# Patient Record
Sex: FEMALE | Race: WHITE | NOT HISPANIC OR LATINO
[De-identification: names, ages, dates, MRNs, and addresses within clinical notes are randomized per-mention and may not be internally consistent; named-entity substitution may affect disease eponyms.]

---

## 2021-04-14 PROBLEM — Z00.00 ENCOUNTER FOR PREVENTIVE HEALTH EXAMINATION: Status: ACTIVE | Noted: 2021-04-14

## 2021-04-19 ENCOUNTER — APPOINTMENT (OUTPATIENT)
Dept: RADIATION ONCOLOGY | Facility: CLINIC | Age: 86
End: 2021-04-19
Payer: MEDICARE

## 2021-04-19 VITALS
SYSTOLIC BLOOD PRESSURE: 129 MMHG | TEMPERATURE: 98 F | HEART RATE: 76 BPM | RESPIRATION RATE: 14 BRPM | OXYGEN SATURATION: 98 % | DIASTOLIC BLOOD PRESSURE: 76 MMHG

## 2021-04-19 DIAGNOSIS — Z86.39 PERSONAL HISTORY OF OTHER ENDOCRINE, NUTRITIONAL AND METABOLIC DISEASE: ICD-10-CM

## 2021-04-19 DIAGNOSIS — Z86.69 PERSONAL HISTORY OF OTHER DISEASES OF THE NERVOUS SYSTEM AND SENSE ORGANS: ICD-10-CM

## 2021-04-19 DIAGNOSIS — Z85.828 PERSONAL HISTORY OF OTHER MALIGNANT NEOPLASM OF SKIN: ICD-10-CM

## 2021-04-19 DIAGNOSIS — Z86.79 PERSONAL HISTORY OF OTHER DISEASES OF THE CIRCULATORY SYSTEM: ICD-10-CM

## 2021-04-19 PROCEDURE — 99072 ADDL SUPL MATRL&STAF TM PHE: CPT

## 2021-04-19 PROCEDURE — 99204 OFFICE O/P NEW MOD 45 MIN: CPT

## 2021-04-19 NOTE — HISTORY OF PRESENT ILLNESS
[FreeTextEntry1] : Ms. Ortega is a 85 year old female referred here by Dr. 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 Specimen C, left anterior ankle, 1.2cm, and specimen D, left mid pretibial lower leg, 1.2cm. The patient is to return to Dr. Rivera on 4/29/21 for follow up on the left distal pretibial lower leg for evidence of recurrence. She was also seeing Dr. Guerrero for suture removal on the left forearm, and he was to evaluate the left lateral distal lower leg. \par \par Mrs. Ortega states she is overall feeling well. She had redness and swelling of the left lower leg. She denies being in any pain today but she does occasionally have discomfort in that lower leg. She was on Keflex for a cellulitis that she recently completed.  She would like information on having high Dose Radiation to the left lower leg. \par

## 2021-04-19 NOTE — PHYSICAL EXAM
[Normal] : oriented to person, place and time, the affect was normal, the mood was normal and not anxious [de-identified] : A raised crusted lesion measuring 2 cm in diameter over left lower lateral leg with reasonably well-defined margins.  2 other smaller punched-out lesions noted over left ankle and left mid pretibial region .

## 2021-05-21 ENCOUNTER — NON-APPOINTMENT (OUTPATIENT)
Age: 86
End: 2021-05-21

## 2021-05-28 ENCOUNTER — NON-APPOINTMENT (OUTPATIENT)
Age: 86
End: 2021-05-28

## 2021-05-28 VITALS
HEART RATE: 74 BPM | RESPIRATION RATE: 12 BRPM | OXYGEN SATURATION: 98 % | SYSTOLIC BLOOD PRESSURE: 124 MMHG | HEIGHT: 60 IN | TEMPERATURE: 98 F | BODY MASS INDEX: 21.2 KG/M2 | DIASTOLIC BLOOD PRESSURE: 74 MMHG | WEIGHT: 108 LBS

## 2021-05-28 NOTE — REVIEW OF SYSTEMS
[Edema Limbs: Grade 1-  5 - 10% inter-limb discrepancy in volume or circumference at point of greatest visible difference; swelling or obscuration of anatomic architecture on close inspection] : Edema Limbs: Grade 1-  5 - 10% inter-limb discrepancy in volume or circumference at point of greatest visible difference; swelling or obscuration of anatomic architecture on close inspection [Fatigue: Grade 1 - Fatigue relieved by rest] : Fatigue: Grade 1 - Fatigue relieved by rest [Alopecia: Grade 0] : Alopecia: Grade 0 [Pruritus: Grade 0] : Pruritus: Grade 0 [Skin Atrophy: Grade 0] : Skin Atrophy: Grade 0 [Skin Hyperpigmentation: Grade 1 - Hyperpigmentation covering <10% BSA; no psychosocial impact] : Skin Hyperpigmentation: Grade 1 - Hyperpigmentation covering <10% BSA; no psychosocial impact [Skin Induration: Grade 0] : Skin Induration: Grade 0 [Dermatitis Radiation: Grade 1 - Faint erythema or dry desquamation] : Dermatitis Radiation: Grade 1 - Faint erythema or dry desquamation

## 2021-05-28 NOTE — VITALS
[Maximal Pain Intensity: 0/10] : 0/10 [Least Pain Intensity: 0/10] : 0/10 [70: Cares for self; unalbe to carry on normal activity or do active work.] : 70: Cares for self; unable to carry on normal activity or do active work. [70: Both greater restriction of and less time spent in play activity.] : 70: Both greater restriction of and less time spent in play activity. [ECOG Performance Status: 2 - Ambulatory and capable of all self care but unable to carry out any work activities] : Performance Status: 2 - Ambulatory and capable of all self care but unable to carry out any work activities. Up and about more than 50% of waking hours [Date: ____________] : Patient's last distress assessment performed on [unfilled]. [0 - No Distress] : Distress Level: 0

## 2021-06-01 ENCOUNTER — NON-APPOINTMENT (OUTPATIENT)
Age: 86
End: 2021-06-01

## 2021-06-01 VITALS
TEMPERATURE: 98 F | HEART RATE: 75 BPM | SYSTOLIC BLOOD PRESSURE: 122 MMHG | OXYGEN SATURATION: 97 % | RESPIRATION RATE: 14 BRPM | DIASTOLIC BLOOD PRESSURE: 83 MMHG

## 2021-06-01 NOTE — HISTORY OF PRESENT ILLNESS
[FreeTextEntry1] : Ms. Ortega is a 85 year old female referred here by Dr. 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 Specimen C, left anterior ankle, 1.2cm, and specimen D, left mid pretibial lower leg, 1.2cm. The patient is to return to Dr. Rivera on 4/29/21 for follow up on the left distal pretibial lower leg for evidence of recurrence. She was also seeing Dr. Guerrero for suture removal on the left forearm, and he was to evaluate the left lateral distal lower leg. \par \par She is here 5/28/21 for an OTV and has completed 4 of 8 fractions 2000cGy to the left leg. She is uaing Aquaphor BID. \par She has slight erythema and swelling of the left lower leg. She denies being in any pain today but she does occasionally have discomfort in that lower leg\par

## 2021-06-01 NOTE — PHYSICAL EXAM
[Normal] : oriented to person, place and time, the affect was normal, the mood was normal and not anxious [de-identified] : Partial regression of the lesion over left leg.  Mild erythema seen

## 2021-06-01 NOTE — DISEASE MANAGEMENT
[Clinical] : TNM Stage: c [I] : I [TTNM] : 1 [NTNM] : 0 [MTNM] : 0 [de-identified] : completed 4 of 8 fractions 2000cGy

## 2021-06-01 NOTE — PHYSICAL EXAM
[Normal] : oriented to person, place and time, the affect was normal, the mood was normal and not anxious [de-identified] : Significant regression of the lesion in her left lower leg.  Moderate erythema of the skin noted

## 2021-06-01 NOTE — HISTORY OF PRESENT ILLNESS
[FreeTextEntry1] : Ms. Ortega is a 85 year old female referred here by Dr. 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 Specimen C, left anterior ankle, 1.2cm, and specimen D, left mid pretibial lower leg, 1.2cm. The patient is to return to Dr. Rivera on 4/29/21 for follow up on the left distal pretibial lower leg for evidence of recurrence. She was also seeing Dr. Guerrero for suture removal on the left forearm, and he was to evaluate the left lateral distal lower leg. \par \par She is here 5/28/21 for an OTV and has completed 4 of 8 fractions 2000cGy to the left leg. She is uaing Aquaphor BID. \par She has slight erythema and swelling of the left lower leg. She denies being in any pain today but she does occasionally have discomfort in that lower leg\par \par

## 2021-06-01 NOTE — DISEASE MANAGEMENT
[Clinical] : TNM Stage: c [I] : I [TTNM] : 1 [NTNM] : 0 [MTNM] : 0 [de-identified] : completed 5 of 8 fractions 2500cGy

## 2021-06-07 ENCOUNTER — NON-APPOINTMENT (OUTPATIENT)
Age: 86
End: 2021-06-07

## 2021-06-07 VITALS
TEMPERATURE: 98 F | OXYGEN SATURATION: 98 % | DIASTOLIC BLOOD PRESSURE: 84 MMHG | HEART RATE: 78 BPM | RESPIRATION RATE: 16 BRPM | SYSTOLIC BLOOD PRESSURE: 126 MMHG

## 2021-06-07 NOTE — VITALS
[Maximal Pain Intensity: 0/10] : 0/10 [Least Pain Intensity: 0/10] : 0/10 [NoTreatment Scheduled] : no treatment scheduled [70: Cares for self; unalbe to carry on normal activity or do active work.] : 70: Cares for self; unable to carry on normal activity or do active work. [70: Both greater restriction of and less time spent in play activity.] : 70: Both greater restriction of and less time spent in play activity. [ECOG Performance Status: 2 - Ambulatory and capable of all self care but unable to carry out any work activities] : Performance Status: 2 - Ambulatory and capable of all self care but unable to carry out any work activities. Up and about more than 50% of waking hours [Date: ____________] : Patient's last distress assessment performed on [unfilled]. [0 - No Distress] : Distress Level: 0

## 2021-06-08 NOTE — DISEASE MANAGEMENT
[Clinical] : TNM Stage: c [I] : I [TTNM] : 1 [NTNM] : 0 [MTNM] : 0 [de-identified] : completed 8 of 8 fractions 4000 cGy

## 2021-06-08 NOTE — HISTORY OF PRESENT ILLNESS
[FreeTextEntry1] : Ms. Ortega is a 85 year old female referred here by Dr. 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 Specimen C, left anterior ankle, 1.2cm, and specimen D, left mid pretibial lower leg, 1.2cm. The patient is to return to Dr. Rivera on 4/29/21 for follow up on the left distal pretibial lower leg for evidence of recurrence. She was also seeing Dr. Guerrero for suture removal on the left forearm, and he was to evaluate the left lateral distal lower leg. \par \par She is here 5/28/21 for an OTV and has completed 4 of 8 fractions 2000cGy to the left leg. She is uaing Aquaphor BID. \par She has slight erythema and swelling of the left lower leg. She denies being in any pain today but she does occasionally have discomfort in that lower leg\par \par

## 2021-06-08 NOTE — PHYSICAL EXAM
[Normal] : oriented to person, place and time, the affect was normal, the mood was normal and not anxious [de-identified] : Brisk erythema of skin over left leg near ankle region.  Complete regression of the lesion seen

## 2021-06-21 ENCOUNTER — APPOINTMENT (OUTPATIENT)
Dept: RADIATION ONCOLOGY | Facility: CLINIC | Age: 86
End: 2021-06-21
Payer: MEDICARE

## 2021-06-21 VITALS
SYSTOLIC BLOOD PRESSURE: 135 MMHG | TEMPERATURE: 97.9 F | DIASTOLIC BLOOD PRESSURE: 79 MMHG | HEIGHT: 60 IN | RESPIRATION RATE: 12 BRPM | WEIGHT: 114 LBS | BODY MASS INDEX: 22.38 KG/M2 | HEART RATE: 76 BPM | OXYGEN SATURATION: 97 %

## 2021-06-21 PROCEDURE — 99024 POSTOP FOLLOW-UP VISIT: CPT

## 2021-06-21 RX ORDER — VALSARTAN 160 MG/1
160 TABLET, COATED ORAL
Qty: 180 | Refills: 0 | Status: ACTIVE | COMMUNITY
Start: 2021-06-15

## 2021-06-21 NOTE — DISEASE MANAGEMENT
[Clinical] : TNM Stage: c [I] : I [TTNM] : 1 [NTNM] : 0 [MTNM] : 0 [de-identified] : completed 8/8 fractions to a dose of 4000 cGy on 6/7/2021 to the left lower extremity

## 2021-06-21 NOTE — REVIEW OF SYSTEMS
[Edema Limbs: Grade 1-  5 - 10% inter-limb discrepancy in volume or circumference at point of greatest visible difference; swelling or obscuration of anatomic architecture on close inspection] : Edema Limbs: Grade 1-  5 - 10% inter-limb discrepancy in volume or circumference at point of greatest visible difference; swelling or obscuration of anatomic architecture on close inspection [Fatigue: Grade 1 - Fatigue relieved by rest] : Fatigue: Grade 1 - Fatigue relieved by rest [Alopecia: Grade 0] : Alopecia: Grade 0 [Pruritus: Grade 1 - Mild or localized; topical intervention indicated] : Pruritus: Grade 1 - Mild or localized; topical intervention indicated [Skin Atrophy: Grade 0] : Skin Atrophy: Grade 0 [Skin Hyperpigmentation: Grade 1 - Hyperpigmentation covering <10% BSA; no psychosocial impact] : Skin Hyperpigmentation: Grade 1 - Hyperpigmentation covering <10% BSA; no psychosocial impact [Skin Induration: Grade 0] : Skin Induration: Grade 0 [Dermatitis Radiation: Grade 1 - Faint erythema or dry desquamation] : Dermatitis Radiation: Grade 1 - Faint erythema or dry desquamation

## 2021-06-21 NOTE — HISTORY OF PRESENT ILLNESS
[FreeTextEntry1] : Ms. Ortega is a 85 year old female referred here by Dr. 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 Specimen C, left anterior ankle, 1.2cm, and specimen D, left mid pretibial lower leg, 1.2cm. The patient is to return to Dr. Rivera on 4/29/21 for follow up on the left distal pretibial lower leg for evidence of recurrence. She was also seeing Dr. Guerrero for suture removal on the left forearm, and he was to evaluate the left lateral distal lower leg. \par \par Mrs. Ortega states she is overall feeling well. She had redness and swelling of the left lower leg. She denies being in any pain today but she does occasionally have discomfort in that lower leg. She was on Keflex for a cellulitis that she recently completed.  She would like information on having high Dose Radiation to the left lower leg. \par \par 6/21/2021\par Ms. Ortega presents today for 10 day PTE. She completed 8/8 fractions to a dose of 4000 cGy on 6/7/2021 to the left lower extremity. Patient denies pain to left leg, 1+pitting, Aquaphor twice a day for pruritus. Some erythema noted. Following up with Dr. Marquez on Thursday.

## 2021-06-21 NOTE — VITALS
[Maximal Pain Intensity: 0/10] : 0/10 [Least Pain Intensity: 0/10] : 0/10 [80: Normal activity with effort; some signs or symptoms of disease.] : 80: Normal activity with effort; some signs or symptoms of disease.  [80: Active, but tires more quickly] : 80: Active, but tires more quickly [ECOG Performance Status: 2 - Ambulatory and capable of all self care but unable to carry out any work activities] : Performance Status: 2 - Ambulatory and capable of all self care but unable to carry out any work activities. Up and about more than 50% of waking hours

## 2021-06-21 NOTE — PHYSICAL EXAM
[Normal] : oriented to person, place and time, the affect was normal, the mood was normal and not anxious [de-identified] : Complete regression of the lesion on the lateral aspect of left lower leg.  No bleeding or discharge noted.  Minimal edema of left ankle region noted

## 2021-09-24 ENCOUNTER — APPOINTMENT (OUTPATIENT)
Dept: RADIATION ONCOLOGY | Facility: CLINIC | Age: 86
End: 2021-09-24
Payer: MEDICARE

## 2021-09-24 VITALS
HEIGHT: 60 IN | OXYGEN SATURATION: 98 % | TEMPERATURE: 98 F | DIASTOLIC BLOOD PRESSURE: 74 MMHG | BODY MASS INDEX: 22.38 KG/M2 | WEIGHT: 114 LBS | HEART RATE: 67 BPM | SYSTOLIC BLOOD PRESSURE: 122 MMHG | RESPIRATION RATE: 12 BRPM

## 2021-09-24 DIAGNOSIS — C44.92 SQUAMOUS CELL CARCINOMA OF SKIN, UNSPECIFIED: ICD-10-CM

## 2021-09-24 DIAGNOSIS — N39.0 URINARY TRACT INFECTION, SITE NOT SPECIFIED: ICD-10-CM

## 2021-09-24 PROCEDURE — 99212 OFFICE O/P EST SF 10 MIN: CPT

## 2021-09-24 RX ORDER — AZATHIOPRINE 50 1/1
50 TABLET ORAL
Qty: 90 | Refills: 0 | Status: ACTIVE | COMMUNITY
Start: 2020-09-29

## 2021-09-24 RX ORDER — AMLODIPINE BESYLATE 5 MG/1
5 TABLET ORAL
Qty: 180 | Refills: 0 | Status: ACTIVE | COMMUNITY
Start: 2021-07-11

## 2021-09-24 RX ORDER — TRIAMCINOLONE ACETONIDE 1 MG/G
0.1 OINTMENT TOPICAL
Qty: 60 | Refills: 0 | Status: ACTIVE | COMMUNITY
Start: 2021-06-24

## 2021-09-24 RX ORDER — SULFAMETHOXAZOLE AND TRIMETHOPRIM 800; 160 MG/1; MG/1
800-160 TABLET ORAL
Refills: 0 | Status: ACTIVE | COMMUNITY

## 2021-09-24 RX ORDER — METOPROLOL SUCCINATE 100 MG/1
100 TABLET, EXTENDED RELEASE ORAL
Qty: 180 | Refills: 0 | Status: ACTIVE | COMMUNITY
Start: 2021-09-22

## 2021-09-24 RX ORDER — DIGOXIN 125 UG/1
125 TABLET ORAL
Qty: 45 | Refills: 0 | Status: ACTIVE | COMMUNITY
Start: 2021-07-08

## 2021-09-24 RX ORDER — BRIMONIDINE TARTRATE 1 MG/ML
0.1 SOLUTION/ DROPS OPHTHALMIC
Qty: 15 | Refills: 0 | Status: ACTIVE | COMMUNITY
Start: 2021-04-13

## 2021-09-24 RX ORDER — BIMATOPROST 0.1 MG/ML
0.01 SOLUTION/ DROPS OPHTHALMIC
Qty: 8 | Refills: 0 | Status: ACTIVE | COMMUNITY
Start: 2021-08-11

## 2021-09-24 RX ORDER — ATORVASTATIN CALCIUM 20 MG/1
20 TABLET, FILM COATED ORAL
Qty: 90 | Refills: 0 | Status: ACTIVE | COMMUNITY
Start: 2021-01-29

## 2021-09-24 RX ORDER — DULOXETINE HYDROCHLORIDE 20 MG/1
20 CAPSULE, DELAYED RELEASE PELLETS ORAL
Qty: 30 | Refills: 0 | Status: ACTIVE | COMMUNITY
Start: 2021-07-27

## 2021-09-24 NOTE — HISTORY OF PRESENT ILLNESS
[FreeTextEntry1] : Ms. Ortega is a 85 year old female referred here by Dr. 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 Specimen C, left anterior ankle, 1.2cm, and specimen D, left mid pretibial lower leg, 1.2cm. The patient is to return to Dr. Rivera on 4/29/21 for follow up on the left distal pretibial lower leg for evidence of recurrence. She was also seeing Dr. Guerrero for suture removal on the left forearm, and he was to evaluate the left lateral distal lower leg. \par \par Mrs. Ortega states she is overall feeling well. She had redness and swelling of the left lower leg. She denies being in any pain today but she does occasionally have discomfort in that lower leg. She was on Keflex for a cellulitis that she recently completed.  She would like information on having high Dose Radiation to the left lower leg. \par \par 9/24/21\par Ms. Ortega presents today for a follow up. She completed 8/8 fractions to a dose of 4000 cGy on 6/7/2021 to the left lower extremity. She  had an additional biopsy on 6/24/21 on the right lower leg revealing squamous cell carcinoma. \par Patient notes good regression of lesion that was treated with no bleeding or itching.

## 2021-09-24 NOTE — DISEASE MANAGEMENT
[Clinical] : TNM Stage: c [I] : I [TTNM] : 1 [NTNM] : 0 [MTNM] : 0 [de-identified] : completed 8/8 fractions to a dose of 4000 cGy on 6/7/2021 to the left lower extremity

## 2021-09-24 NOTE — PHYSICAL EXAM
[Thin] : thin [Normal] : oriented to person, place and time, the affect was normal, the mood was normal and not anxious [de-identified] : Complete regression of the previously treated lesion over left lower extremity.  No residual hyperpigmentation or telangiectasia seen.  However patient has multiple cutaneous lesions on both her lower extremities of varying sizes

## 2021-11-26 ENCOUNTER — APPOINTMENT (OUTPATIENT)
Dept: VASCULAR SURGERY | Facility: CLINIC | Age: 86
End: 2021-11-26
Payer: MEDICARE

## 2021-11-26 PROCEDURE — 99213 OFFICE O/P EST LOW 20 MIN: CPT

## 2022-01-28 ENCOUNTER — APPOINTMENT (OUTPATIENT)
Dept: VASCULAR SURGERY | Facility: CLINIC | Age: 87
End: 2022-01-28
Payer: MEDICARE

## 2022-01-28 PROCEDURE — 99214 OFFICE O/P EST MOD 30 MIN: CPT

## 2023-06-29 NOTE — VITALS
[Maximal Pain Intensity: 0/10] : 0/10 [Least Pain Intensity: 0/10] : 0/10 [NoTreatment Scheduled] : no treatment scheduled [60: Requires occasional assistance, but is able to care for most of his/her needs] : 60: Requires occasional assistance, but is able to care for most of his/her needs [ECOG Performance Status: 3 - Capable of only limited self care, confined to bed or chair more than 50% of waking hours] : Performance Status: 3 - Capable of only limited self care, confined to bed or chair more than 50% of waking hours [Date: ____________] : Patient's last distress assessment performed on [unfilled]. [10 - Extreme Distress] : Distress Level: 10 [FreeTextEntry7] : She is very anxious about the Radiation. I discussed the [facility] Wellness program that she will consider once she is started on the HDR for her skin \par  29-Jun-2023 09:30